# Patient Record
Sex: FEMALE | Race: BLACK OR AFRICAN AMERICAN | Employment: FULL TIME | URBAN - METROPOLITAN AREA
[De-identification: names, ages, dates, MRNs, and addresses within clinical notes are randomized per-mention and may not be internally consistent; named-entity substitution may affect disease eponyms.]

---

## 2020-03-18 ENCOUNTER — OFFICE VISIT (OUTPATIENT)
Dept: FAMILY MEDICINE CLINIC | Age: 60
End: 2020-03-18

## 2020-03-18 VITALS
TEMPERATURE: 98.4 F | BODY MASS INDEX: 44.41 KG/M2 | HEIGHT: 60 IN | SYSTOLIC BLOOD PRESSURE: 120 MMHG | RESPIRATION RATE: 16 BRPM | WEIGHT: 226.2 LBS | HEART RATE: 84 BPM | OXYGEN SATURATION: 96 % | DIASTOLIC BLOOD PRESSURE: 80 MMHG

## 2020-03-18 DIAGNOSIS — M54.2 CERVICAL PAIN (NECK): Primary | ICD-10-CM

## 2020-03-18 DIAGNOSIS — Z76.89 ENCOUNTER TO ESTABLISH CARE: ICD-10-CM

## 2020-03-18 PROBLEM — E66.01 OBESITY, MORBID (HCC): Status: ACTIVE | Noted: 2020-03-18

## 2020-03-18 RX ORDER — DICLOFENAC SODIUM 50 MG/1
50 TABLET, DELAYED RELEASE ORAL 2 TIMES DAILY
Qty: 60 TAB | Refills: 0 | Status: SHIPPED | OUTPATIENT
Start: 2020-03-18 | End: 2020-05-20 | Stop reason: ALTCHOICE

## 2020-03-18 RX ORDER — TRAMADOL HYDROCHLORIDE 50 MG/1
TABLET ORAL
COMMUNITY
Start: 2020-03-08 | End: 2020-05-20 | Stop reason: ALTCHOICE

## 2020-03-18 RX ORDER — DIAZEPAM 5 MG/1
TABLET ORAL
COMMUNITY
Start: 2020-03-08 | End: 2020-05-20 | Stop reason: ALTCHOICE

## 2020-03-18 NOTE — PROGRESS NOTES
Identified pt with two pt identifiers(name and ). Reviewed record in preparation for visit and have obtained necessary documentation. Chief Complaint   Patient presents with   [facility] Establish Care    Neck Pain     Pt states that on 20; pt injured left side, neck radiants down to shoulder w/ back pain        Health Maintenance Due   Topic    Hepatitis C Screening     DTaP/Tdap/Td series (1 - Tdap)    PAP AKA CERVICAL CYTOLOGY     Lipid Screen     Shingrix Vaccine Age 50> (1 of 2)    Breast Cancer Screen Mammogram     FOBT Q1Y Age 54-65     Influenza Age 5 to Adult        Coordination of Care Questionnaire:  :   1) Have you been to an emergency room, urgent care, or hospitalized since your last visit? If yes, where when, and reason for visit? Yes,   [facility]      2. Have seen or consulted any other health care provider since your last visit? no  If yes, where when, and reason for visit? Patient is accompanied by self I have received verbal consent from Lonny Marcos to discuss any/all medical information while they are present in the room.
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
## 2020-03-23 ENCOUNTER — HOSPITAL ENCOUNTER (OUTPATIENT)
Dept: MRI IMAGING | Age: 60
Discharge: HOME | End: 2020-03-23
Attending: NURSE PRACTITIONER
Payer: OTHER MISCELLANEOUS

## 2020-03-23 DIAGNOSIS — M54.2 CERVICAL PAIN (NECK): ICD-10-CM

## 2020-03-23 PROCEDURE — 72141 MRI NECK SPINE W/O DYE: CPT

## 2020-03-25 ENCOUNTER — TELEPHONE (OUTPATIENT)
Dept: FAMILY MEDICINE CLINIC | Age: 60
End: 2020-03-25

## 2020-03-25 DIAGNOSIS — M54.2 CERVICAL PAIN (NECK): Primary | ICD-10-CM

## 2020-04-08 ENCOUNTER — APPOINTMENT (OUTPATIENT)
Dept: PHYSICAL THERAPY | Age: 60
End: 2020-04-08

## 2020-04-10 ENCOUNTER — APPOINTMENT (OUTPATIENT)
Dept: PHYSICAL THERAPY | Age: 60
End: 2020-04-10

## 2020-05-20 ENCOUNTER — VIRTUAL VISIT (OUTPATIENT)
Dept: FAMILY MEDICINE CLINIC | Age: 60
End: 2020-05-20

## 2020-05-20 VITALS — WEIGHT: 219 LBS | BODY MASS INDEX: 43 KG/M2 | HEIGHT: 60 IN

## 2020-05-20 DIAGNOSIS — G43.909 MIGRAINE WITHOUT STATUS MIGRAINOSUS, NOT INTRACTABLE, UNSPECIFIED MIGRAINE TYPE: ICD-10-CM

## 2020-05-20 DIAGNOSIS — M54.2 CERVICAL PAIN (NECK): Primary | ICD-10-CM

## 2020-05-20 RX ORDER — PROPRANOLOL HYDROCHLORIDE 40 MG/1
TABLET ORAL
COMMUNITY
Start: 2020-04-03 | End: 2020-05-20 | Stop reason: ALTCHOICE

## 2020-05-20 RX ORDER — BUSPIRONE HYDROCHLORIDE 5 MG/1
TABLET ORAL
COMMUNITY
Start: 2020-04-03

## 2020-05-20 RX ORDER — BUTALBITAL, ACETAMINOPHEN AND CAFFEINE 50; 325; 40 MG/1; MG/1; MG/1
1 TABLET ORAL
Qty: 30 TAB | Refills: 0 | Status: SHIPPED | OUTPATIENT
Start: 2020-05-20

## 2020-05-20 NOTE — PROGRESS NOTES
Saurav Aguiar is a 60 y.o. female      Chief Complaint   Patient presents with    Migraine      Due to neck and shoulder          1. Have you been to the ER, urgent care clinic since your last visit? Hospitalized since your last visit? No      2. Have you seen or consulted any other health care providers outside of the [address] [place] [address] since your last visit? Include any pap smears or colon screening.    No
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
## 2022-03-19 PROBLEM — E66.01 OBESITY, MORBID (HCC): Status: ACTIVE | Noted: 2020-03-18

## 2022-09-08 ENCOUNTER — OFFICE VISIT (OUTPATIENT)
Dept: FAMILY MEDICINE CLINIC | Age: 62
End: 2022-09-08
Payer: COMMERCIAL

## 2022-09-08 VITALS
HEIGHT: 60 IN | TEMPERATURE: 97.9 F | SYSTOLIC BLOOD PRESSURE: 124 MMHG | RESPIRATION RATE: 17 BRPM | BODY MASS INDEX: 42.53 KG/M2 | HEART RATE: 89 BPM | DIASTOLIC BLOOD PRESSURE: 78 MMHG | WEIGHT: 216.6 LBS | OXYGEN SATURATION: 98 %

## 2022-09-08 DIAGNOSIS — R42 DIZZINESS: ICD-10-CM

## 2022-09-08 DIAGNOSIS — F17.200 CURRENT EVERY DAY SMOKER: ICD-10-CM

## 2022-09-08 DIAGNOSIS — Z09 HOSPITAL DISCHARGE FOLLOW-UP: Primary | ICD-10-CM

## 2022-09-08 PROCEDURE — 1111F DSCHRG MED/CURRENT MED MERGE: CPT | Performed by: NURSE PRACTITIONER

## 2022-09-08 PROCEDURE — 99214 OFFICE O/P EST MOD 30 MIN: CPT | Performed by: NURSE PRACTITIONER

## 2022-09-08 NOTE — PROGRESS NOTES
Chief Complaint   Patient presents with    Follow-up     ER follow up      1. Have you been to the ER, urgent care clinic since your last visit? [address] [place] Hospitalized since your last visit? No    2. Have you seen or consulted any other health care providers outside of the [address] [place] [address] since your last visit? Include any pap smears or colon screening.  No negative

## 2022-09-08 NOTE — PROGRESS NOTES
Transitional Care Management Progress Note    Patient: Claudetta Nay  : 1960  PCP: Macarena Gonzalez NP    Date of admission:  patient  Date of discharge:     Patient was contacted by Transitional Care Management services within two days after her discharge: No. This encounter and supporting documentation was reviewed if available. Medication reconciliation was performed today (2022). Assessment/Plan:   Diagnoses and all orders for this visit:    1. Hospital discharge follow-up  -     [place] DISCHARGE MEDS RECONCILED W/ CURRENT OUTPATIENT MED LIST    2. Dizziness  -     REFERRAL TO ENT-OTOLARYNGOLOGY  -     REFERRAL TO PHYSICAL THERAPY  - Unchanged, advised patient not to drive referral to ENT for further evaluation treatment. Referral to physical therapy for vestibular PT. advised patient if any symptoms change or worsen or she develops any signs of a stroke that we reviewed seek urgent care immediately    3. Current every day smoker  - Advised patient to work on smoking cessation       Subjective:   Claudetta Nay is a 57 y.o. female presenting today for follow-up after being discharged from [facility].  The discharge summary was reviewed or requested. The main problem requiring admission was dizziness, palpitations, sweating. Complications during admission: none    Went to ER for ongoing dizziness for 3 weeks, developed palpitation and sweating. CT of head normal  Chest xray was normal  Labs were normal    Given meclizine, took yesterday and fell asleep  No improvement on the dizziness  Feels dizzy when she drives  Patient would like a note out of work for next wednesday    Palpitations and sweating have resolved. Interval history/Current status: stable    Admitting symptoms have: not changed      Medications marked \"taking\" at this time:  Home Medications    Medication Sig Start Date End Date Taking?  Authorizing Provider   busPIRone (BUSPAR) 5 mg tablet TAKE 1 TABLET BY MOUTH TWICE A DAY 4/3/20   Provider, Historical   butalbital-acetaminophen-caffeine (FIORICET, ESGIC) -40 mg per tablet Take 1 Tab by mouth every four (4) hours as needed for Headache or Migraine. 5/20/20   Jagruti Walls, TEJ        Review of Systems:  Review of Systems   Constitutional:  Negative for chills, fever and malaise/fatigue. Eyes:  Negative for blurred vision, pain and discharge. Respiratory:  Negative for cough and shortness of breath. Cardiovascular:  Negative for chest pain, palpitations and leg swelling. Neurological:  Positive for dizziness. Negative for focal weakness, seizures, loss of consciousness, weakness and headaches. Patient Active Problem List   Diagnosis Code    Ob[facility], morbid ([facility] E66.01          Objective:   /78 (BP 1 Location: Left upper arm, BP Patient Position: Sitting, BP Cuff Size: Large adult)   Pulse 89   Temp 97.9 °F (36.6 °C) (Temporal)   Resp 17   Ht 5' (1.524 m)   Wt 216 lb 9.6 oz (98.2 kg)   SpO2 98%   BMI 42.30 kg/m²      Physical Exam  Constitutional:       General: She is not in acute distress. Appearance: She is not diaphoretic. HENT:      Head: Normocephalic and atraumatic. Cardiovascular:      Rate and Rhythm: Normal rate and regular rhythm. Heart sounds: Normal heart sounds. No murmur heard. No friction rub. No gallop. Pulmonary:      Effort: Pulmonary effort is normal. No respiratory distress. Breath sounds: Normal breath sounds. No wheezing or rales. Skin:     General: Skin is warm and dry. Coloration: Skin is not pale. Neurological:      Mental Status: She is alert. Cranial Nerves: Cranial nerves are intact. Sensory: Sensation is intact. Motor: Motor function is intact. Coordination: Coordination is intact. Gait: Gait is intact. We discussed the expected course, resolution and complications of the diagnosis(es) in detail.   Medication risks, benefits, costs, interactions, and alternatives were discussed as indicated. I advised her to contact the office if her condition worsens, changes or fails to improve as anticipated. She expressed understanding with the diagnosis(es) and plan.      Adrien Fontaine NP

## 2023-05-24 RX ORDER — BUTALBITAL, ACETAMINOPHEN AND CAFFEINE 50; 325; 40 MG/1; MG/1; MG/1
1 TABLET ORAL EVERY 4 HOURS PRN
COMMUNITY
Start: 2020-05-20

## 2023-05-24 RX ORDER — BUSPIRONE HYDROCHLORIDE 5 MG/1
1 TABLET ORAL 2 TIMES DAILY
COMMUNITY
Start: 2020-04-03